# Patient Record
Sex: MALE | Race: WHITE | NOT HISPANIC OR LATINO | Employment: OTHER | URBAN - METROPOLITAN AREA
[De-identification: names, ages, dates, MRNs, and addresses within clinical notes are randomized per-mention and may not be internally consistent; named-entity substitution may affect disease eponyms.]

---

## 2021-05-29 ENCOUNTER — RECORDS - HEALTHEAST (OUTPATIENT)
Dept: ADMINISTRATIVE | Facility: CLINIC | Age: 76
End: 2021-05-29

## 2022-10-06 ENCOUNTER — TRANSCRIBE ORDERS (OUTPATIENT)
Dept: OTHER | Age: 77
End: 2022-10-06

## 2022-10-06 DIAGNOSIS — L60.2 ONYCHOGRYPHOSIS: Primary | ICD-10-CM

## 2022-11-16 ENCOUNTER — OFFICE VISIT (OUTPATIENT)
Dept: PODIATRY | Facility: CLINIC | Age: 77
End: 2022-11-16
Payer: COMMERCIAL

## 2022-11-16 DIAGNOSIS — E11.42 DIABETIC POLYNEUROPATHY ASSOCIATED WITH TYPE 2 DIABETES MELLITUS (H): Primary | ICD-10-CM

## 2022-11-16 DIAGNOSIS — L60.2 ONYCHOGRYPHOSIS: ICD-10-CM

## 2022-11-16 DIAGNOSIS — M20.42 HAMMERTOE OF LEFT FOOT: ICD-10-CM

## 2022-11-16 PROCEDURE — 99203 OFFICE O/P NEW LOW 30 MIN: CPT | Performed by: PODIATRIST

## 2022-11-16 NOTE — PROGRESS NOTES
PATIENT HISTORY:    Ramiro Penaloza is a 77 year old male who presents to clinic for diabetic foot check.  Patient notes he was recently diagnosed with diabetes about a year ago.  Notes that sugars are well controlled.  Denies fever, nausea, vomiting.  Notes intermittent pain that lasts for a few seconds but does not stick around.  Would like to have nails cut today and establish care for this.    Review of Systems:  Patient denies fever, chills, rash, wound, stiffness, limping, , weakness, heart burn, blood in stool, chest pain with activity, calf pain when walking, shortness of breath with activity, chronic cough, easy bleeding/bruising, swelling of ankles, excessive thirst, fatigue, depression, anxiety.  Patient admits to numbness.     PAST MEDICAL HISTORY:   Past Medical History:   Diagnosis Date     Arthritis      Walking and running         PAST SURGICAL HISTORY:   Past Surgical History:   Procedure Laterality Date     SEPTOPLASTY, TURBINOPLASTY, COMBINED  6/21/2011    Procedure:COMBINED SEPTOPLASTY, TURBINOPLASTY; Septoplasty and Turbinoreduction; Surgeon:GERMAN ZUÑIGA; Location:[facility]     toes       TONSILLECTOMY          MEDICATIONS:   Current Outpatient Medications:      Ibuprofen 200 MG capsule, Take 200 mg by mouth every 4 hours as needed. (Patient not taking: Reported on 11/16/2022), Disp: , Rfl:      oxycodone-acetaminophen (PERCOCET) 5-325 MG per tablet, Take 1-2 tablets by mouth every 4 hours as needed for pain. (Patient not taking: Reported on 11/16/2022), Disp: 30 tablet, Rfl: 0     senna-docusate (SENOKOT-S;PERICOLACE) 8.6-50 MG per tablet, Take 1-2 tablets by mouth 2 times daily., Disp: 30 tablet, Rfl: 0     sodium chloride (SODIUM CHLORIDE) 0.65 % nasal spray, Spray 2 sprays in nostril every 4 hours as needed (nose bleed). (Patient not taking: Reported on 11/16/2022), Disp: 1 Bottle, Rfl: 0     sodium chloride (SODIUM CHLORIDE) 0.65 % nasal spray, Spray 2 sprays in nostril 4 times  daily. (Patient not taking: Reported on 11/16/2022), Disp: 1 Bottle, Rfl: 0     ALLERGIES:    Allergies   Allergen Reactions     Bee Venom Shortness Of Breath        SOCIAL HISTORY:   Social History     Socioeconomic History     Marital status:      Spouse name: Not on file     Number of children: Not on file     Years of education: Not on file     Highest education level: Not on file   Occupational History     Not on file   Tobacco Use     Smoking status: Former     Smokeless tobacco: Not on file   Substance and Sexual Activity     Alcohol use: Yes     Drug use: No     Sexual activity: Not on file   Other Topics Concern     Not on file   Social History Narrative     Not on file     Social Determinants of Health     Financial Resource Strain: Not on file   Food Insecurity: Not on file   Transportation Needs: Not on file   Physical Activity: Not on file   Stress: Not on file   Social Connections: Not on file   Intimate Partner Violence: Not on file   Housing Stability: Not on file        FAMILY HISTORY: No family history on file.     EXAM:Vitals: There were no vitals taken for this visit.  BMI= There is no height or weight on file to calculate BMI.     A1C:  6.2 per patient.     General appearance: Patient is alert and fully cooperative with history & exam.  No sign of distress is noted during the visit.     Psychiatric: Affect is pleasant & appropriate.  Patient appears motivated to improve health.     Respiratory: Breathing is regular & unlabored while sitting.     HEENT: Hearing is intact to spoken word.  Speech is clear.  No gross evidence of visual impairment that would impact ambulation.     Dermatologic:      Vascular: DP & PT pulses are intact & regular bilaterally.  No significant edema or varicosities noted.  CFT and skin temperature is normal to both lower extremities.     Neurologic: Lower extremity sensation is absent to feet. .     Musculoskeletal: Patient is ambulatory without assistive device  or brace.  Rigid contracture of the right great toe and left toes 2 through 4.      ASSESSMENT:    Onychogryphosis  Diabetic polyneuropathy associated with type 2 diabetes mellitus (H)  Hammertoe of left foot     Medical Decision Making/Plan:  Reviewed patient's chart in [facility].  Reviewed and discussed proper diabetic foot care.  Talked about checking the feet multiple times a day.  Discussed that if he develops calluses to come in to be seen as these are pressure areas that can lead to ulcers.  Currently he is not diabetic shoes and inserts.  Nails were cut today.  No charge.  He was given a list of places that do routine nail care as we do not do that here in the clinic.  All questions were answered to patient's satisfaction and he will call for the questions or concerns.    Patient risk factor: Patient is at low risk for infection.        Kristel Martinez DPM, Podiatry/Foot and Ankle Surgery

## 2022-11-16 NOTE — PATIENT INSTRUCTIONS
"Thank you for choosing [facility] Podiatry / Foot & Ankle Surgery!    DR TRAN'S CLINIC:  [facility]   [address]   [place] [address]   (Tues, Wed, Thurs AM, Fri PM)      TRIAGE LINE: [phone]  APPOINTMENTS: [phone]  RADIOLOGY: [phone]  SET UP SURGERY: [phone]  PHYSICAL THERAPY: [phone]   BILLING QUESTIONS: [phone]  FAX: [phone]     Follow up 1x/ year for re-check      DIABETES AND YOUR FEET  Diabetes can result in several problems in the feet including ulcers (open sores) and amputations. Two of the most important reasons why people develop foot problems when they have diabetes is : 1. Neuropathy (loss of feeling)  2. Vascular disease (loss or decrease of blood flow).    Neuropathy is a term used to describe a loss of nerve function.  Patients with diabetes are at risk of developing neuropathy if their sugars continue to run high and are above the normal value. One theory for neuropathy is that the \"extra\" sugar in the body enters the nerves and is broken down. These by-products build up in the nerve causing it to swell and impairing nerve function. Often times, this can be prevented by controlling your sugars, dieting and exercise.    When a person develops neuropathy, they usually begin to feel numbness or tingling in their feet and sometime in their legs.  Other symptoms may include painful burning or hot feet, tingling or feeling like insects or ants are crawling on your feet or legs.  If the diabetes is sever and the sugars run high for long periods of time, neuropathy can also occur in the hands.    Vascular disease  is a term used to describe a loss or decrease in circulation (blood flow). There is a problem in getting blood and oxygen to areas that need it. Similar to neuropathy, sugars can build up in the walls of the arteries (blood vessels) and cause them to become swollen, thickened and hardened. This decreases the amount of " blood that can go to an area that needs it. Though this is common in the legs of diabetic patients, it can also affect other arteries (blood vessels) in the body such as in the heart and eyes.    In the legs, vascular disease usually results in cramping. Patients who develop leg cramps after walking the same distance every time (i.e. One block, half a mile, ect.) need to let their doctors know so that their circulation may be checked. Cramps causing severe pain in the feet and/or legs while sleeping and the cramps go away when you stand or hang your legs off the side of the bed, may also be a sign of poor blood circulation.  Occasional cramping in cold weather or on rare occasions with activity may not be due to poor circulation, but you should inform your doctor.    PREVENTION OF THESE DISEASES  The key to prevention is good blood sugar control. Poor blood sugar control is a big reason many of these problems start. Physical activity (exercise) is a very good way to help decrease your blood sugars. Exercise can lower your blood sugar, blood pressure, and cholesterol. It also reduces your risk for heart disease and stroke, relieves stress, and strengthens your heart, muscles and bones.  In addition, regular activity helps insulin work better, improves your blood circulation, and keeps your joints flexible. If you're trying to lose weight, a combination of exercise and wise food choices can help you reach your target weight and maintain it.      PAIN MANAGEMENT (**Please speak with your primary doctor about any medications**)  1.Blood Sugar Control - Most important  2. Medications such as:  Amytriptylline, duloxetine, gabapentin, lyrica, tramadol (talk with your primary care doctor about this).     NUTRITION:  Nutrition is also important to help with healing. If your body does not have what it needs, it can't heal.   Increasing your protein intake is important.  With wounds you need 60-90gm of protein a day to help  "with healing. Over the counter protein shakes such as Seun, Glucerna, Ensure, ect... can help to supplement your daily protein intake.   It is also important to take Vitamins to help with healing.  Vitamins such as B12, B6 and Vitamin D3 are important for healing. These can be gotten over the counter at pharmacies or at stores like [employer] or the [employer].    I can also prescribe a dietary supplement called \"Rheumate\" that has a lot of essential vitamins in one capsule.  This may not be covered by insurance though.     FOOT CARE RECOMMENDATIONS   1. Wash your feet with lukewarm water and a mild soap and then dry them thoroughly, especially between the toes.     2. Examine your feet daily looking for cuts, corns, blisters, cracks, ect, especially after wearing new shoes. Make sure to look between your toes. If you cannot see the bottom of your feet, set a mirror on the floor and hold your foot over it, or ask a spouse, friend or family member to examine your feet for you. Contact your doctor immediately if new problems are noted or if sores are not healing.     3. Immediately apply moisturizer to the tops and bottoms of your feet, avoiding areas between the toes. Hand lotion (Intesive Care, Madison, Eucerin, Neutrogena, Curel, ect) is sufficient unless your doctor prescribes a medicated lotion. Apply sunscreen to your feet when going swimming outside.     4. Use clean comfortable shoes, wear white socks (if you have any bleeding or drainage, you will see it on white socks). Socks should not have thick seams or cut off the circulation around the leg. Break in new shoes slowly and rotate with older shoes until broken in. Check the inside of your shoes with your hand to look for areas of irritation or objects that may have fallen into your shoes.       5. Keep slippers by the side of your bed for use during the night.     6.  Shoes should be fitted by a professional and should not cause areas of irritation.  Check your " feet regularly when wearing a new pair of shoes and replace them as needed.     7.  Talk to your doctor about proper exercise. Exercise and stretching stimulate blood flow to your feet and maintain proper glucose levels.     8.  Monitor your blood glucose level as instructed by your doctor. Notify your doctor immediately if your blood sugar is abnormally high or low.    9. Cut your nails straight across, but then gently round any sharp edges with a cardboard nail file. If you have neuropathy, peripheral vascular disease or cannot see that well to trim your own toenails contact [facility] ([phone]) or [facility] ([phone]).      THINGS TO AVOID DOING   1.  Do not soak your feet if you have an open sore. Use only lukewarm water and always check the temperature with your hand as hot water can easily burn your feet.       2.  Never use a hot water bottle or heating pad on your feet. Also do not apply cold compresses to your feet. With decreased sensation, you could burn or freeze your feet.       3.  Do not apply any of these to your feet:    -  Over the counter medicine for corns or warts    -  Harsh chemicals like boric acid    -  Do not self-treat corns, cuts, blisters or infections. Always consult your doctor.       4.  Do not wear sandals, slippers or walk barefoot, especially on hot sand or concrete or other harsh surfaces.     5.  If you smoke, stop!!!      Here is a list of routine foot care resources, which includes toenail trimming and callus/corn management.     This is not a referral. It is your responsibility to contact the organization and your insurance to confirm cost and coverage.      ROUTINE FOOT CARE (NAIL TRIMMING / CALLUSES)      [facility] (in home)  [phone]   [facility] (out of pocket)  [phone]  Multiple locations   [facility]  [phone] [facility]  [phone]  Multiple locations   [facility]  [phone]  [facility]  [phone]  Multiple locations   [facility]  [phone]  Multiple locations [facility]   [phone]   Multiple locations

## 2022-11-16 NOTE — LETTER
11/16/2022         RE: Ramiro Penaloza  [address] [place] [address]        Dear Colleague,    Thank you for referring your patient, Ramiro Penaloza, to the [facility]. Please see a copy of my visit note below.    PATIENT HISTORY:    Ramiro Penaloza is a 77 year old male who presents to clinic for diabetic foot check.  Patient notes he was recently diagnosed with diabetes about a year ago.  Notes that sugars are well controlled.  Denies fever, nausea, vomiting.  Notes intermittent pain that lasts for a few seconds but does not stick around.  Would like to have nails cut today and establish care for this.    Review of Systems:  Patient denies fever, chills, rash, wound, stiffness, limping, , weakness, heart burn, blood in stool, chest pain with activity, calf pain when walking, shortness of breath with activity, chronic cough, easy bleeding/bruising, swelling of ankles, excessive thirst, fatigue, depression, anxiety.  Patient admits to numbness.     PAST MEDICAL HISTORY:   Past Medical History:   Diagnosis Date     Arthritis      Walking and running         PAST SURGICAL HISTORY:   Past Surgical History:   Procedure Laterality Date     SEPTOPLASTY, TURBINOPLASTY, COMBINED  6/21/2011    Procedure:COMBINED SEPTOPLASTY, TURBINOPLASTY; Septoplasty and Turbinoreduction; Surgeon:GERMAN ZUÑIGA; Location:[facility]     toes       TONSILLECTOMY          MEDICATIONS:   Current Outpatient Medications:      Ibuprofen 200 MG capsule, Take 200 mg by mouth every 4 hours as needed. (Patient not taking: Reported on 11/16/2022), Disp: , Rfl:      oxycodone-acetaminophen (PERCOCET) 5-325 MG per tablet, Take 1-2 tablets by mouth every 4 hours as needed for pain. (Patient not taking: Reported on 11/16/2022), Disp: 30 tablet, Rfl: 0     senna-docusate (SENOKOT-S;PERICOLACE) 8.6-50 MG per tablet, Take 1-2 tablets by mouth 2 times daily., Disp: 30 tablet, Rfl: 0      sodium chloride (SODIUM CHLORIDE) 0.65 % nasal spray, Spray 2 sprays in nostril every 4 hours as needed (nose bleed). (Patient not taking: Reported on 11/16/2022), Disp: 1 Bottle, Rfl: 0     sodium chloride (SODIUM CHLORIDE) 0.65 % nasal spray, Spray 2 sprays in nostril 4 times daily. (Patient not taking: Reported on 11/16/2022), Disp: 1 Bottle, Rfl: 0     ALLERGIES:    Allergies   Allergen Reactions     Bee Venom Shortness Of Breath        SOCIAL HISTORY:   Social History     Socioeconomic History     Marital status:      Spouse name: Not on file     Number of children: Not on file     Years of education: Not on file     Highest education level: Not on file   Occupational History     Not on file   Tobacco Use     Smoking status: Former     Smokeless tobacco: Not on file   Substance and Sexual Activity     Alcohol use: Yes     Drug use: No     Sexual activity: Not on file   Other Topics Concern     Not on file   Social History Narrative     Not on file     Social Determinants of Health     Financial Resource Strain: Not on file   Food Insecurity: Not on file   Transportation Needs: Not on file   Physical Activity: Not on file   Stress: Not on file   Social Connections: Not on file   Intimate Partner Violence: Not on file   Housing Stability: Not on file        FAMILY HISTORY: No family history on file.     EXAM:Vitals: There were no vitals taken for this visit.  BMI= There is no height or weight on file to calculate BMI.     A1C:  6.2 per patient.     General appearance: Patient is alert and fully cooperative with history & exam.  No sign of distress is noted during the visit.     Psychiatric: Affect is pleasant & appropriate.  Patient appears motivated to improve health.     Respiratory: Breathing is regular & unlabored while sitting.     HEENT: Hearing is intact to spoken word.  Speech is clear.  No gross evidence of visual impairment that would impact ambulation.     Dermatologic:      Vascular: DP & PT  pulses are intact & regular bilaterally.  No significant edema or varicosities noted.  CFT and skin temperature is normal to both lower extremities.     Neurologic: Lower extremity sensation is absent to feet. .     Musculoskeletal: Patient is ambulatory without assistive device or brace.  Rigid contracture of the right great toe and left toes 2 through 4.      ASSESSMENT:    Onychogryphosis  Diabetic polyneuropathy associated with type 2 diabetes mellitus (H)  Hammertoe of left foot     Medical Decision Making/Plan:  Reviewed patient's chart in [facility].  Reviewed and discussed proper diabetic foot care.  Talked about checking the feet multiple times a day.  Discussed that if he develops calluses to come in to be seen as these are pressure areas that can lead to ulcers.  Currently he is not diabetic shoes and inserts.  Nails were cut today.  No charge.  He was given a list of places that do routine nail care as we do not do that here in the clinic.  All questions were answered to patient's satisfaction and he will call for the questions or concerns.    Patient risk factor: Patient is at low risk for infection.        Kristel Martinez DPM, Podiatry/Foot and Ankle Surgery        Again, thank you for allowing me to participate in the care of your patient.        Sincerely,        Kristel Martinez DPM, Podiatry/Foot and Ankle Surgery